# Patient Record
Sex: FEMALE | Race: BLACK OR AFRICAN AMERICAN | NOT HISPANIC OR LATINO | Employment: FULL TIME | URBAN - METROPOLITAN AREA
[De-identification: names, ages, dates, MRNs, and addresses within clinical notes are randomized per-mention and may not be internally consistent; named-entity substitution may affect disease eponyms.]

---

## 2023-01-20 ENCOUNTER — TELEPHONE (OUTPATIENT)
Dept: HEMATOLOGY/ONCOLOGY | Facility: CLINIC | Age: 43
End: 2023-01-20
Payer: COMMERCIAL

## 2023-01-20 DIAGNOSIS — E85.9 AMYLOIDOSIS, UNSPECIFIED TYPE: Primary | ICD-10-CM

## 2023-01-20 NOTE — TELEPHONE ENCOUNTER
Asif Roach, RN  Javi Medeiros     I have verified medical and transplant benefit for patient at [facility] only.     [place]           Previous Messages     ----- Message -----   From: Mala Roach RN   Sent: 1/17/2023  12:25 PM CST   To: Mya Cornejo, Asif Rebolledo   Subject: SCT Benefits Check                               Hello,     Please complete benefit check for patient. Medical benefit check and transplant benefit check for [facility] only.     Auto     Thank you,   Mala

## 2023-01-20 NOTE — LETTER
Fax Transmission   Date:   23      To:      [facility]                            From:  Maximus Ray, BMT Lay Navigator   Fax:  [phone]                   Fax:      [phone]   Phone:      [phone] Phone:  [phone] (Lay Navigator)               [phone] (Pathology lab)       TIME SENSITIVE   IF THERE ARE ANY PROBLEMS WITH THIS TRANSMISSION, PLEASE CALL IMMEDIATELY. THANK YOU.    Patient:  _Danica Shell_     _[id number]_Gggyfoqtu: __Amyloidosis___    Specimen ID: _____________  Date Collected: _22_  Type of Specimen: ____renal bx____      Specimen ID: _____________  Date Collected: 22_  Type of Specimen: ___bone marrow bx_____      Please send the following:   Entire case, including  o All glass slides   o 10 unstained slides of tumor   Pathology reports (to include results of cytogenetic, molecular studies, next gen sequencing, flow cytometry, FISH and other tests, if any)    PLEASE FAX PATH REPORT -[phone] AT THE TIME OF SHIPMENT AS WELL AS SENDING COPY OF REPORT WITH SPECIMEN.    See attached [employer] label to use for shipping, tracking number ___________    Shipping to:  [facility] - URGENT [facility]CIMEN ALERT  [facility] 4th floor  [address] [place] [address]    CONFIDENTIALITY NOTICE: The accompanying facsimile is intended solely for the use of the recipient designated above. Document(s) transmitted herewith may contain information that is confidential and privileged. Delivery, distribution or dissemination of this communication other than to the intended recipient is strictly prohibited. If you have received this facsimile in error, please notify [facility]'s Corporate Integrity Department immediately by telephone at [phone].

## 2023-01-25 ENCOUNTER — OFFICE VISIT (OUTPATIENT)
Dept: HEMATOLOGY/ONCOLOGY | Facility: CLINIC | Age: 43
End: 2023-01-25
Payer: COMMERCIAL

## 2023-01-25 ENCOUNTER — LAB VISIT (OUTPATIENT)
Dept: LAB | Facility: HOSPITAL | Age: 43
End: 2023-01-25
Payer: COMMERCIAL

## 2023-01-25 VITALS
DIASTOLIC BLOOD PRESSURE: 57 MMHG | HEART RATE: 102 BPM | WEIGHT: 168.56 LBS | BODY MASS INDEX: 27.09 KG/M2 | SYSTOLIC BLOOD PRESSURE: 99 MMHG | RESPIRATION RATE: 16 BRPM | OXYGEN SATURATION: 97 % | HEIGHT: 66 IN

## 2023-01-25 DIAGNOSIS — E85.81 AL AMYLOIDOSIS: ICD-10-CM

## 2023-01-25 DIAGNOSIS — R53.82 CHRONIC FATIGUE: ICD-10-CM

## 2023-01-25 DIAGNOSIS — Z76.82 STEM CELL TRANSPLANT CANDIDATE: ICD-10-CM

## 2023-01-25 DIAGNOSIS — I10 PRIMARY HYPERTENSION: ICD-10-CM

## 2023-01-25 DIAGNOSIS — I50.20 HEART FAILURE WITH REDUCED EJECTION FRACTION: ICD-10-CM

## 2023-01-25 LAB
ALBUMIN SERPL BCP-MCNC: 2.2 G/DL (ref 3.5–5.2)
ALP SERPL-CCNC: 236 U/L (ref 55–135)
ALT SERPL W/O P-5'-P-CCNC: 14 U/L (ref 10–44)
ANION GAP SERPL CALC-SCNC: 12 MMOL/L (ref 8–16)
ANISOCYTOSIS BLD QL SMEAR: ABNORMAL
AST SERPL-CCNC: 26 U/L (ref 10–40)
BASO STIPL BLD QL SMEAR: ABNORMAL
BASOPHILS # BLD AUTO: 0.02 K/UL (ref 0–0.2)
BASOPHILS NFR BLD: 0.2 % (ref 0–1.9)
BILIRUB SERPL-MCNC: 2.2 MG/DL (ref 0.1–1)
BUN SERPL-MCNC: 14 MG/DL (ref 6–20)
BURR CELLS BLD QL SMEAR: ABNORMAL
CALCIUM SERPL-MCNC: 9 MG/DL (ref 8.7–10.5)
CHLORIDE SERPL-SCNC: 100 MMOL/L (ref 95–110)
CO2 SERPL-SCNC: 31 MMOL/L (ref 23–29)
CREAT SERPL-MCNC: 1.3 MG/DL (ref 0.5–1.4)
DACRYOCYTES BLD QL SMEAR: ABNORMAL
DIFFERENTIAL METHOD: ABNORMAL
EOSINOPHIL # BLD AUTO: 0 K/UL (ref 0–0.5)
EOSINOPHIL NFR BLD: 0.2 % (ref 0–8)
ERYTHROCYTE [DISTWIDTH] IN BLOOD BY AUTOMATED COUNT: 22.4 % (ref 11.5–14.5)
EST. GFR  (NO RACE VARIABLE): 51.7 ML/MIN/1.73 M^2
GIANT PLATELETS BLD QL SMEAR: PRESENT
GLUCOSE SERPL-MCNC: 84 MG/DL (ref 70–110)
HCT VFR BLD AUTO: 37.5 % (ref 37–48.5)
HGB BLD-MCNC: 12.2 G/DL (ref 12–16)
HOWELL-JOLLY BOD BLD QL SMEAR: ABNORMAL
HYPOCHROMIA BLD QL SMEAR: ABNORMAL
IMM GRANULOCYTES # BLD AUTO: 0.05 K/UL (ref 0–0.04)
IMM GRANULOCYTES NFR BLD AUTO: 0.4 % (ref 0–0.5)
LYMPHOCYTES # BLD AUTO: 2.5 K/UL (ref 1–4.8)
LYMPHOCYTES NFR BLD: 20.2 % (ref 18–48)
MCH RBC QN AUTO: 29.3 PG (ref 27–31)
MCHC RBC AUTO-ENTMCNC: 32.5 G/DL (ref 32–36)
MCV RBC AUTO: 90 FL (ref 82–98)
MONOCYTES # BLD AUTO: 1.1 K/UL (ref 0.3–1)
MONOCYTES NFR BLD: 9.3 % (ref 4–15)
NEUTROPHILS # BLD AUTO: 8.5 K/UL (ref 1.8–7.7)
NEUTROPHILS NFR BLD: 69.7 % (ref 38–73)
NRBC BLD-RTO: 2 /100 WBC
OVALOCYTES BLD QL SMEAR: ABNORMAL
PLATELET # BLD AUTO: 515 K/UL (ref 150–450)
PLATELET BLD QL SMEAR: ABNORMAL
PMV BLD AUTO: 12.6 FL (ref 9.2–12.9)
POIKILOCYTOSIS BLD QL SMEAR: SLIGHT
POLYCHROMASIA BLD QL SMEAR: ABNORMAL
POTASSIUM SERPL-SCNC: 3.2 MMOL/L (ref 3.5–5.1)
PROT SERPL-MCNC: 4.8 G/DL (ref 6–8.4)
RBC # BLD AUTO: 4.16 M/UL (ref 4–5.4)
SCHISTOCYTES BLD QL SMEAR: ABNORMAL
SCHISTOCYTES BLD QL SMEAR: PRESENT
SODIUM SERPL-SCNC: 143 MMOL/L (ref 136–145)
TARGETS BLD QL SMEAR: ABNORMAL
WBC # BLD AUTO: 12.2 K/UL (ref 3.9–12.7)

## 2023-01-25 PROCEDURE — 99205 OFFICE O/P NEW HI 60 MIN: CPT | Mod: S$GLB,,, | Performed by: INTERNAL MEDICINE

## 2023-01-25 PROCEDURE — 80053 COMPREHEN METABOLIC PANEL: CPT | Performed by: INTERNAL MEDICINE

## 2023-01-25 PROCEDURE — 86334 PATHOLOGIST INTERPRETATION IFE: ICD-10-PCS | Mod: 26,,, | Performed by: PATHOLOGY

## 2023-01-25 PROCEDURE — 3074F SYST BP LT 130 MM HG: CPT | Mod: CPTII,S$GLB,, | Performed by: INTERNAL MEDICINE

## 2023-01-25 PROCEDURE — 85025 COMPLETE CBC W/AUTO DIFF WBC: CPT | Performed by: INTERNAL MEDICINE

## 2023-01-25 PROCEDURE — 86334 IMMUNOFIX E-PHORESIS SERUM: CPT | Performed by: INTERNAL MEDICINE

## 2023-01-25 PROCEDURE — 99999 PR PBB SHADOW E&M-EST. PATIENT-LVL III: CPT | Mod: PBBFAC,,, | Performed by: INTERNAL MEDICINE

## 2023-01-25 PROCEDURE — 84165 PATHOLOGIST INTERPRETATION SPE: ICD-10-PCS | Mod: 26,,, | Performed by: PATHOLOGY

## 2023-01-25 PROCEDURE — 3074F PR MOST RECENT SYSTOLIC BLOOD PRESSURE < 130 MM HG: ICD-10-PCS | Mod: CPTII,S$GLB,, | Performed by: INTERNAL MEDICINE

## 2023-01-25 PROCEDURE — 3008F BODY MASS INDEX DOCD: CPT | Mod: CPTII,S$GLB,, | Performed by: INTERNAL MEDICINE

## 2023-01-25 PROCEDURE — 99999 PR PBB SHADOW E&M-EST. PATIENT-LVL III: ICD-10-PCS | Mod: PBBFAC,,, | Performed by: INTERNAL MEDICINE

## 2023-01-25 PROCEDURE — 83880 ASSAY OF NATRIURETIC PEPTIDE: CPT | Performed by: INTERNAL MEDICINE

## 2023-01-25 PROCEDURE — 3008F PR BODY MASS INDEX (BMI) DOCUMENTED: ICD-10-PCS | Mod: CPTII,S$GLB,, | Performed by: INTERNAL MEDICINE

## 2023-01-25 PROCEDURE — 99205 PR OFFICE/OUTPT VISIT, NEW, LEVL V, 60-74 MIN: ICD-10-PCS | Mod: S$GLB,,, | Performed by: INTERNAL MEDICINE

## 2023-01-25 PROCEDURE — 84484 ASSAY OF TROPONIN QUANT: CPT | Performed by: INTERNAL MEDICINE

## 2023-01-25 PROCEDURE — 86334 IMMUNOFIX E-PHORESIS SERUM: CPT | Mod: 26,,, | Performed by: PATHOLOGY

## 2023-01-25 PROCEDURE — 3078F DIAST BP <80 MM HG: CPT | Mod: CPTII,S$GLB,, | Performed by: INTERNAL MEDICINE

## 2023-01-25 PROCEDURE — 84165 PROTEIN E-PHORESIS SERUM: CPT | Performed by: INTERNAL MEDICINE

## 2023-01-25 PROCEDURE — 3078F PR MOST RECENT DIASTOLIC BLOOD PRESSURE < 80 MM HG: ICD-10-PCS | Mod: CPTII,S$GLB,, | Performed by: INTERNAL MEDICINE

## 2023-01-25 PROCEDURE — 83521 IG LIGHT CHAINS FREE EACH: CPT | Mod: 59 | Performed by: INTERNAL MEDICINE

## 2023-01-25 PROCEDURE — 84165 PROTEIN E-PHORESIS SERUM: CPT | Mod: 26,,, | Performed by: PATHOLOGY

## 2023-01-25 RX ORDER — POTASSIUM CHLORIDE 750 MG/1
TABLET, EXTENDED RELEASE ORAL
COMMUNITY
Start: 2022-11-11

## 2023-01-25 RX ORDER — DEXAMETHASONE 4 MG/1
TABLET ORAL
COMMUNITY
Start: 2022-11-11

## 2023-01-25 RX ORDER — EMPAGLIFLOZIN 10 MG/1
10 TABLET, FILM COATED ORAL
COMMUNITY
Start: 2023-01-12

## 2023-01-25 RX ORDER — ONDANSETRON HYDROCHLORIDE 8 MG/1
8 TABLET, FILM COATED ORAL 3 TIMES DAILY
COMMUNITY
Start: 2022-10-03

## 2023-01-25 RX ORDER — SPIRONOLACTONE 50 MG/1
50 TABLET, FILM COATED ORAL EVERY MORNING
COMMUNITY
Start: 2022-10-19

## 2023-01-25 RX ORDER — ACYCLOVIR 400 MG/1
400 TABLET ORAL 2 TIMES DAILY
COMMUNITY
Start: 2022-10-03

## 2023-01-25 RX ORDER — FUROSEMIDE 40 MG/1
40 TABLET ORAL EVERY MORNING
COMMUNITY
Start: 2022-10-19

## 2023-01-25 RX ORDER — CYCLOPHOSPHAMIDE 50 MG/1
CAPSULE ORAL
COMMUNITY
Start: 2022-10-12

## 2023-01-25 RX ORDER — METOPROLOL SUCCINATE 25 MG/1
TABLET, EXTENDED RELEASE ORAL
COMMUNITY
Start: 2022-12-06

## 2023-01-25 NOTE — PROGRESS NOTES
CC: AL amyloidosis, stem cell transplant consultation    HPI:  is a 43-year-old female with hypertension, thrombocytosis, here for stem cell transplant consultation for AL amyloidosis. Per outside records, she was hospitalized on 9/15/2022 with bilateral lower extremity edema. Her cardiac work-up revealed significantly reduced ejection fraction between 20 to 25% and cardiac catheterization was negative for occlusive coronary artery disease. She was noted to have nephrotic range of proteinuria. She underwent kidney biopsy which revealed AL amyloidosis . 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    From care everywhere:    Interval history 10/14/2022.  The patient presents to the office today for follow-up of her amyloidosis, nephrotic syndrome, congestive heart failure and myeloma. She received her first weekly course of chemotherapy with Velcade, Darzalex and dexamethasone last week. She had mild nausea and fatigability after first cycle. She is complaining of significant bilateral lower extremity swelling and some weight gain.    Interval history 10/28/2022.  The patient presents to the office today for follow-up of her AL amyloidosis, myeloma, nephrotic syndrome and congestive heart failure. She is currently on chemotherapy with weekly Velcade, Darzalex, Cytoxan and dexamethasone. She is complaining of cold-like symptoms mostly cough. She tried Mucinex without much improvement in her cough.    Interval history 11/11/2022.  The patient presents to the office today for follow-up of her AL amyloidosis. She developed significant diarrhea. She indicates Imodium did not help her diarrhea. She missed her last weeks chemotherapy and presented to the emergency room and received IV fluids. She indicates her diarrhea is slightly better.    Interval history 12/8/2022.  The patient presents to the office today for follow-up  of her amyloidosis. She lost significant amount of weight. She indicates she is feeling better. Her diarrhea is better.    Interval history 1/13/2023.  The patient presents to the office today for follow-up of her AL amyloidosis and myeloma. She is currently on chemotherapy with CyBorD Darzalex. She is tolerating her chemotherapy well. She indicates she is feeling much better after losing weight and edema.  He is not reporting any dyspnea.    Interval history 1/20/23  Patient presents to clinic in f/u for amyloidosis., She reports waking this morning with nausea and one episode of vomiting. She has not taken anything for NV or eaten this morning. She denies cough, SOB, cold symptoms, fever, diarrhea, constipation. After Cmp resulted patient found to be hypokalemic. She reports she is only taking 1 10mEq potassium BID not 2.          MEDICAL HISTORY:     CHF (congestive heart failure)   Hypertension.    SURGICAL HISTORY:  Cholecystectomy;   Cervical spine surgery (2000);   Sinus surgery;   Tonsillectomy;   Hysterectomy;   CT Guided Biopsy Renal (9/19/2022); and   IR Diagnostic Bone Marrow Biopsy/Aspiration (9/22/2022).    ALLERGIES:      Allergen Reactions   Sulfamethoxazole Hives   Sulfa (Sulfonamide Antibiotics) Itching and Rash     SOCIAL HISTORY:    She does not smoke or drinks alcohol. She works for [employer].    FAMILY HISTORY:   Family History   Problem Relation Age of Onset   Diabetes Mother   Asthma Father   No Significant Medical History Daughter   Arthritis Maternal Grandmother         Review of Systems   Constitutional:  Positive for malaise/fatigue. Negative for chills, fever and weight loss.   HENT:  Negative for congestion, ear discharge, nosebleeds, sinus pain and tinnitus.    Eyes:  Negative for double vision and photophobia.   Respiratory:  Negative for cough, sputum production, shortness of breath and stridor.    Cardiovascular:  Negative for orthopnea and claudication.    Gastrointestinal:  Negative for constipation, heartburn, melena, nausea and vomiting.   Genitourinary:  Negative for frequency and urgency.   Musculoskeletal:  Negative for back pain and myalgias.   Neurological:  Negative for tremors, sensory change, speech change, weakness and headaches.   Endo/Heme/Allergies:  Negative for environmental allergies. Does not bruise/bleed easily.   Psychiatric/Behavioral:  Negative for depression, hallucinations, substance abuse and suicidal ideas. The patient is not nervous/anxious.       Vitals:    01/25/23 1409   BP: (!) 99/57   Pulse: 102   Resp: 16     PS: ECOG 1    Physical Exam  HENT:      Head: Normocephalic and atraumatic.      Mouth/Throat:      Pharynx: No posterior oropharyngeal erythema.   Cardiovascular:      Rate and Rhythm: Normal rate and regular rhythm.      Heart sounds: No murmur heard.  Pulmonary:      Effort: Pulmonary effort is normal. No respiratory distress.      Breath sounds: Normal breath sounds.   Abdominal:      General: There is no distension.      Palpations: There is no mass.   Musculoskeletal:         General: No swelling.   Lymphadenopathy:      Cervical: No cervical adenopathy.   Skin:     Coloration: Skin is not jaundiced.   Neurological:      General: No focal deficit present.      Mental Status: She is alert.        9/7/22 SPEP: Monoclonal paraprotein measuring 1.07g/dl present  sIFE: IgG kappa      9/15/22 ECHO    1. Normal left ventricular cavity size. Severely depressed left ventricular systolic   function. LVEF 20 - 25%.   2. Borderline right ventricular cavity size.   3. Mild mitral valve regurgitation.   4. Mild tricuspid valve regurgitation.      9/22/22 lef tkidney  random biopsy    AL amyloidosis    9/27/22 BM biopsy    --normocellular marrow  Plasma cells increased, ~ 40%  -[place] red negative       12/19/22 24hr UPEP    Protein Urine Non Calculated MG/.6    Protein Urine 24 HR 0 - 149 MG/24HR 2,278 High     24 Hour Urine  Volume ml 500       12/19/22 2D ECHO    1. Normal left ventricular cavity size. Severely depressed left ventricular systolic   function. LVEF 25 - 30%. Global hypokinesis.   2. Normal right ventricular size. Severe right ventricular hypokinesis.   3. Mild to moderate mitral valve regurgitation.   4. Mild to moderate tricuspid valve regurgitation. No pulmonary hypertension.      Assessment:    1. Systemic AL amyloidosis  2. Heart failure  with decreased ejection fraction  3. HTn, primary  4. Fatigue  5. Plasma cell neoplasm      Plan:    1,2. She has biopsy proven AL amyloidosis of kidney. She has no bleeding. No diarrhea. No skin changes/ ecchymoses. No macroglossia. No neuropathy. She has decreased ejection fraction, to ~25%, etiology unclear. She is on [id number].   No typical cardiac amyloid changes noted on ECHO done in Sept and Dec 2022. She is on diuretics, beta blocker. She follows with cardiology in [place].  Optimal cardiac function to undergo autologous stem cell transplant is at least 45%.  She will also be evaluated at our cardiac amyloidosis clinic.   She will have NT pro BNP, troponin T, SPEP, light chains checked today. Systemic imaging with PET CT/ whole body MRI recommended.     3. On beta blocker, spironolactone, lasix      BMT Chart Routing      Follow up with physician . Refer to cardiology (dr. kebede/ [facility])   Follow up with [facility] 2 months.   Provider visit type    Infusion scheduling note    Injection scheduling note    Labs    Imaging    Pharmacy appointment    Other referrals

## 2023-01-26 ENCOUNTER — LAB VISIT (OUTPATIENT)
Dept: LAB | Facility: HOSPITAL | Age: 43
End: 2023-01-26
Attending: INTERNAL MEDICINE
Payer: COMMERCIAL

## 2023-01-26 DIAGNOSIS — E85.9 AMYLOIDOSIS, UNSPECIFIED TYPE: ICD-10-CM

## 2023-01-26 LAB
ALBUMIN SERPL ELPH-MCNC: 2.6 G/DL (ref 3.35–5.55)
ALPHA1 GLOB SERPL ELPH-MCNC: 0.31 G/DL (ref 0.17–0.41)
ALPHA2 GLOB SERPL ELPH-MCNC: 0.61 G/DL (ref 0.43–0.99)
B-GLOBULIN SERPL ELPH-MCNC: 0.47 G/DL (ref 0.5–1.1)
GAMMA GLOB SERPL ELPH-MCNC: 0.32 G/DL (ref 0.67–1.58)
KAPPA LC SER QL IA: 0.79 MG/DL (ref 0.33–1.94)
KAPPA LC/LAMBDA SER IA: 1.1 (ref 0.26–1.65)
LAMBDA LC SER QL IA: 0.72 MG/DL (ref 0.57–2.63)
PROT SERPL-MCNC: 4.3 G/DL (ref 6–8.4)

## 2023-01-26 PROCEDURE — 88342 IMHCHEM/IMCYTCHM 1ST ANTB: CPT | Performed by: PATHOLOGY

## 2023-01-26 PROCEDURE — 88323 CONSLTJ&REPRT MATRL PREP SLD: CPT | Mod: 26,,, | Performed by: PATHOLOGY

## 2023-01-26 PROCEDURE — 88342 IMHCHEM/IMCYTCHM 1ST ANTB: CPT | Mod: 26,59,, | Performed by: PATHOLOGY

## 2023-01-26 PROCEDURE — 88321 CONSLTJ&REPRT SLD PREP ELSWR: CPT | Performed by: PATHOLOGY

## 2023-01-26 PROCEDURE — 88323 PR  MICROSLIDE CONSULT W SLIDE PREP: ICD-10-PCS | Mod: 26,,, | Performed by: PATHOLOGY

## 2023-01-26 PROCEDURE — 88325 CONSLTJ COMPRE RVW REC REPRT: CPT | Performed by: PATHOLOGY

## 2023-01-26 PROCEDURE — 88341 PR IHC OR ICC EACH ADD'L SINGLE ANTIBODY  STAINPR: ICD-10-PCS | Mod: 26,59,, | Performed by: PATHOLOGY

## 2023-01-26 PROCEDURE — 88341 IMHCHEM/IMCYTCHM EA ADD ANTB: CPT | Performed by: PATHOLOGY

## 2023-01-26 PROCEDURE — 88341 IMHCHEM/IMCYTCHM EA ADD ANTB: CPT | Mod: 26,59,, | Performed by: PATHOLOGY

## 2023-01-26 PROCEDURE — 88342 CHG IMMUNOCYTOCHEMISTRY: ICD-10-PCS | Mod: 26,59,, | Performed by: PATHOLOGY

## 2023-01-27 LAB
NT-PROBNP SERPL IA-MCNC: ABNORMAL PG/ML
PATHOLOGIST INTERPRETATION SPE: NORMAL
TROPONIN T SERPL-MCNC: 325 NG/L

## 2023-01-30 LAB — INTERPRETATION SERPL IFE-IMP: NORMAL

## 2023-01-31 LAB — PATHOLOGIST INTERPRETATION IFE: NORMAL

## 2023-02-01 LAB
COMMENT: NORMAL
FINAL PATHOLOGIC DIAGNOSIS: NORMAL
Lab: NORMAL
MICROSCOPIC EXAM: NORMAL

## 2023-04-13 ENCOUNTER — TELEPHONE (OUTPATIENT)
Dept: HEMATOLOGY/ONCOLOGY | Facility: CLINIC | Age: 43
End: 2023-04-13
Payer: COMMERCIAL

## 2023-04-13 ENCOUNTER — PATIENT MESSAGE (OUTPATIENT)
Dept: HEMATOLOGY/ONCOLOGY | Facility: CLINIC | Age: 43
End: 2023-04-13
Payer: COMMERCIAL

## 2023-04-13 DIAGNOSIS — E85.81 AL AMYLOIDOSIS: Primary | ICD-10-CM

## 2023-04-13 NOTE — TELEPHONE ENCOUNTER
----- Message from Meme Parham sent at 4/13/2023  9:24 AM CDT -----  Regarding: returning Missed Call  Contact: Pt  Pt is calling back from a missed call.   Pt. Would like a call back from Kayleigh.   Pt stated she didn't complete the Pet Ct. Pt stated she is also currently in the Hospital.      Confirmed contact below:   Contact Name:Danica Shell  Phone Number: [phone]

## 2023-04-27 ENCOUNTER — TELEPHONE (OUTPATIENT)
Dept: HEMATOLOGY/ONCOLOGY | Facility: CLINIC | Age: 43
End: 2023-04-27
Payer: COMMERCIAL

## 2023-04-27 NOTE — TELEPHONE ENCOUNTER
----- Message from Araceli Cruz sent at 4/27/2023  3:51 PM CDT -----  Patient is scheduled for a PET test and insurance DENIED test, an in basket was sent to providers office for an appeal. Please determine what provider plans on doing and please call patient to get appt cancel and discuss other options if necessary. Any questions please call radiology at ext [phone]

## 2023-05-03 ENCOUNTER — PATIENT MESSAGE (OUTPATIENT)
Dept: RADIOLOGY | Facility: HOSPITAL | Age: 43
End: 2023-05-03
Payer: COMMERCIAL

## 2023-05-09 ENCOUNTER — TELEPHONE (OUTPATIENT)
Dept: HEMATOLOGY/ONCOLOGY | Facility: CLINIC | Age: 43
End: 2023-05-09
Payer: COMMERCIAL